# Patient Record
Sex: FEMALE | Race: BLACK OR AFRICAN AMERICAN | Employment: UNEMPLOYED | URBAN - METROPOLITAN AREA
[De-identification: names, ages, dates, MRNs, and addresses within clinical notes are randomized per-mention and may not be internally consistent; named-entity substitution may affect disease eponyms.]

---

## 2018-03-08 ENCOUNTER — OFFICE VISIT (OUTPATIENT)
Dept: OBGYN CLINIC | Age: 18
End: 2018-03-08

## 2018-03-08 VITALS
DIASTOLIC BLOOD PRESSURE: 72 MMHG | SYSTOLIC BLOOD PRESSURE: 110 MMHG | HEIGHT: 62 IN | WEIGHT: 112 LBS | BODY MASS INDEX: 20.61 KG/M2

## 2018-03-08 DIAGNOSIS — N92.0 MENORRHAGIA WITH REGULAR CYCLE: ICD-10-CM

## 2018-03-08 DIAGNOSIS — Z30.09 FAMILY PLANNING: ICD-10-CM

## 2018-03-08 DIAGNOSIS — Z11.3 SCREENING FOR VENEREAL DISEASE: ICD-10-CM

## 2018-03-08 DIAGNOSIS — Z01.411 ENCOUNTER FOR GYNECOLOGICAL EXAMINATION (GENERAL) (ROUTINE) WITH ABNORMAL FINDINGS: Primary | ICD-10-CM

## 2018-03-08 LAB
HCG URINE, QL. (POC): NEGATIVE
VALID INTERNAL CONTROL?: YES

## 2018-03-08 NOTE — PROGRESS NOTES
[address] [facility]  [email or web address]  [phone]    Cesar Lainez MD, FACOG       Annual Gynecologic Exam:  WWE <40  Chief Complaint   Patient presents with    Well Woman    Menstrual Problem         Aminah Gonsales is a 19 y.o.  [address] female who presents for an annual well woman exam.  Patient's last menstrual period was 2018 (exact date). .    With regard to the Gardisil vaccine, she received 2 of the 3 injections. She does report additional concerns today. Patient reports heavy periods with cramping and vomiting every month. Patient states that her periods have gotten heavier and more painful as she has gotten older. Patient would like to discuss hormonal options to help with her periods. +SA, + condoms. Menstrual status:  Her periods are heavy, with cramping. She does report dysmenorrhea/painful menses. She does not report irregular bleeding. Sexual history and Contraception:  History   Sexual Activity    Sexual activity: Yes    Partners: Male    Birth control/ protection: None     She sometimes use condoms with sexual activity  She does not reports new sexual partner(s) in   e last year. The patient does not request STD testing. We recommended testing per CDC guidelines and at patient request.     Preventive Medicine History:  Her most recent Pap smear result: N/A   Her most recent HR HPV screen was N/A  She does not have a history of MANJINDER 2, 3 or cervical cancer. History reviewed. No pertinent past medical history.   OB History    Para Term  AB Living   1    1    SAB TAB Ectopic Molar Multiple Live Births    1          # Outcome Date GA Lbr Rickie/2nd Weight Sex Delivery Anes PTL Lv   1 TAB 2016                Past Surgical History:   Procedure Laterality Date    HX HERNIA REPAIR      HX WISDOM TEETH EXTRACTION       Family History   Problem Relation Age of Onset    Prostate Cancer Father Social History     Social History    Marital status: SINGLE     Spouse name: N/A    Number of children: N/A    Years of education: N/A     Occupational History    Not on file. Social History Main Topics    Smoking status: Former Smoker    Smokeless tobacco: Never Used    Alcohol use Yes      Comment: a few weekends per month    Drug use: No    Sexual activity: Yes     Partners: Male     Birth control/ protection: None     Other Topics Concern    Not on file     Social History Narrative    No narrative on file       No Known Allergies    Current Outpatient Prescriptions   Medication Sig    norethindrone-e estradiol-iron (LOMEDIA 24 FE) 1 mg-20 mcg (24)/75 mg (4) tab Take 1 Tab by mouth daily. Indications: MENORRHAGIA     No current facility-administered medications for this visit. There is no problem list on file for this patient.       Review of Systems - History obtained from the patient  Constitutional: negative for weight loss, fever, night sweats  HEENT: negative for hearing loss, earache, congestion, snoring, sorethroat  CV: negative for chest pain, palpitations, edema  Resp: negative for cough, shortness of breath, wheezing  GI: negative for change in bowel habits, abdominal pain, black or bloody stools  : negative for frequency, dysuria, hematuria  GYN: see HPI  MSK: negative for back pain, joint pain, muscle pain  Breast: negative for breast lumps, nipple discharge, galactorrhea  Skin :negative for itching, rash, hives  Neuro: negative for dizziness, headache, confusion, weakness  Psych: negative for anxiety, depression, change in mood  Heme/lymph: negative for bleeding, bruising, pallor    Physical Exam  Visit Vitals    /72    Ht 5' 2\" (1.575 m)    Wt 112 lb (50.8 kg)    LMP 03/06/2018 (Exact Date)    BMI 20.49 kg/m2       Constitutional  · Appearance: well-nourished, well developed, alert, in no acute distress    HENT  · Head and Face: appears normal    Neck  · Inspection/Palpation: normal appearance, no masses or tenderness  · Lymph Nodes: no lymphadenopathy present  · Thyroid: gland size normal, nontender, no nodules or masses present on palpation    Chest  · Respiratory Effort: breathing unlabored  · Auscultation: normal breath sounds    Cardiovascular  · Heart:  · Auscultation: regular rate and rhythm without murmur    Breasts  · Inspection of Breasts: breasts symmetrical, no skin changes, no discharge present, nipple appearance normal, no skin retraction present  · Palpation of Breasts and Axillae: no masses present on palpation, no breast tenderness  · Axillary Lymph Nodes: no lymphadenopathy present    Gastrointestinal  · Abdominal Examination: abdomen non-tender to palpation, normal bowel sounds, no masses present  · Liver and spleen: no hepatomegaly present, spleen not palpable  · Hernias: no hernias identified    Genitourinary  · External Genitalia: normal appearance for age, no discharge present, no tenderness present, no inflammatory lesions present, no masses present  · Vagina: normal vaginal vault without central or paravaginal defects, blood tinged discharge present, no inflammatory lesions present, no masses present  · Bladder: non-tender to palpation  · Urethra: appears normal  · Cervix: normal   · Uterus: normal size, shape and consistency  · Adnexa: no adnexal tenderness present, no adnexal masses present  · Perineum: perineum within normal limits, no evidence of trauma, no rashes or skin lesions present  · Anus: anus within normal limits, no hemorrhoids present  · Inguinal Lymph Nodes: no lymphadenopathy present    Skin  · General Inspection: no rash, no lesions identified    Neurologic/Psychiatric  · Mental Status:  · Orientation: grossly oriented to person, place and time  · Mood and Affect: mood normal, affect appropriate    Assessment:  19 y.o.  for well woman exam  Encounter Diagnoses   Name Primary?     Encounter for gynecological examination (general) (routine) with abnormal findings Yes    Screening for venereal disease     Family planning     Menorrhagia with regular cycle        Plan:  The patient was counseled about diet, exercise, healthy lifestyle  We discussed self breast exam  We discussed safer sex practices, condom use and risk factors for sexually transmitted diseases. We discussed current pap smear and HR HPV testing guidelines. We recommend follow up one year for routine annual gynecologic exam or sooner prn  We recommend routine follow up with her primary care doctor for management of chronic medical problems and non-gynecologic concerns  Handouts were given to the patient  We discussed calcium/vitamin D/weight bearing exercise and osteoporosis prevention  Discussed risks, benefits and alternatives of OCP/nuvaring/patch: including but not limited to dvt/pe/mi/cva/ca/gi risks. She is encouraged to read package insert and to follow up with me or her pharmacist with any questions or concerns. We discussed progesterone only and non hormonal options for contraception including but not limited to condoms, IUDs, Nexplanon, and depo provera. ocp fu 3-4 mos  ocp ho  [place] to start OCP on first day of cycle and use back up for first pack.     Folllow up:  [x] return for annual well woman exam in one year or sooner if she is having problems  [] follow up and ultrasound  [] 6 months  [] 3 months  [] 6 weeks   [] 1 month    Orders Placed This Encounter    CHLAMYDIA/GC PCR    AMB POC URINE PREGNANCY TEST, VISUAL COLOR COMPARISON    norethindrone-e estradiol-iron (LOMEDIA 24 FE) 1 mg-20 mcg (24)/75 mg (4) tab       Results for orders placed or performed in visit on 03/08/18   AMB POC URINE PREGNANCY TEST, VISUAL COLOR COMPARISON   Result Value Ref Range    VALID INTERNAL CONTROL POC Yes     HCG urine, Ql. (POC) Negative Negative

## 2018-03-08 NOTE — PATIENT INSTRUCTIONS

## 2018-03-08 NOTE — MR AVS SNAPSHOT
[address] [facility] [address] [facility] 
[phone] Patient: Belinda Mccrary MRN: [id number] Visit Information Date & Time Provider Department Dept. Phone Encounter #  
 3/8/2018  9:00 AM Maggie Andrews MD Adan Gilliam [phone] [id number] Upcoming Health Maintenance Date Due  
 HPV AGE 9Y-34Y (1 of 3 - Female 3 Dose Series) 2/16/2009 Influenza Age 5 to Adult 8/1/2017 Allergies as of 3/8/2018  Review Complete On: 3/8/2018 By: Malia Moy LPN No Known Allergies Current Immunizations  Never Reviewed No immunizations on file. Not reviewed this visit Vitals BP Height(growth percentile) Weight(growth percentile) LMP BMI OB Status 110/72 5' 2\" (1.575 m) 112 lb (50.8 kg) 03/06/2018 (Exact Date) 20.49 kg/m2 Having regular periods Smoking Status Former Smoker BMI and BSA Data Body Mass Index Body Surface Area  
 20.49 kg/m 2 1.49 m 2 Your Updated Medication List  
  
Notice  As of 3/8/2018  9:16 AM  
 You have not been prescribed any medications. Patient Instructions Well Visit, Ages 25 to 48: Care Instructions Your Care Instructions Physical exams can help you stay healthy. Your doctor has checked your overall health and may have suggested ways to take good care of yourself. He or she also may have recommended tests. At home, you can help prevent illness with healthy eating, regular exercise, and other steps. Follow-up care is a key part of your treatment and safety. Be sure to make and go to all appointments, and call your doctor if you are having problems. It's also a good idea to know your test results and keep a list of the medicines you take. How can you care for yourself at home? · Reach and stay at a healthy weight.  This will lower your risk for many problems, such as obesity, diabetes, heart disease, and high blood pressure. · Get at least 30 minutes of physical activity on most days of the week. Walking is a good choice. You also may want to do other activities, such as running, swimming, cycling, or playing tennis or team sports. Discuss any changes in your exercise program with your doctor. · Do not smoke or allow others to smoke around you. If you need help quitting, talk to your doctor about stop-smoking programs and medicines. These can increase your chances of quitting for good. · Talk to your doctor about whether you have any risk factors for sexually transmitted infections (STIs). Having one sex partner (who does not have STIs and does not have sex with anyone else) is a good way to avoid these infections. · Use birth control if you do not want to have children at this time. Talk with your doctor about the choices available and what might be best for you. · Protect your skin from too much sun. When you're outdoors from 10 a.m. to 4 p.m., stay in the shade or cover up with clothing and a hat with a wide brim. Wear sunglasses that block UV rays. Even when it's cloudy, put broad-spectrum sunscreen (SPF 30 or higher) on any exposed skin. · See a dentist one or two times a year for checkups and to have your teeth cleaned. · Wear a seat belt in the car. · Drink alcohol in moderation, if at all. That means no more than 2 drinks a day for men and 1 drink a day for women. Follow your doctor's advice about when to have certain tests. These tests can spot problems early. For everyone · Cholesterol. Have the fat (cholesterol) in your blood tested after age 21. Your doctor will tell you how often to have this done based on your age, family history, or other things that can increase your risk for heart disease. · Blood pressure.  Have your blood pressure checked during a routine doctor visit. Your doctor will tell you how often to check your blood pressure based on your age, your blood pressure results, and other factors. · Vision. Talk with your doctor about how often to have a glaucoma test. 
· Diabetes. Ask your doctor whether you should have tests for diabetes. · Colon cancer. Have a test for colon cancer at age 48. You may have one of several tests. If you are younger than 48, you may need a test earlier if you have any risk factors. Risk factors include whether you already had a precancerous polyp removed from your colon or whether your parent, brother, sister, or child has had colon cancer. For women · Breast exam and mammogram. Talk to your doctor about when you should have a clinical breast exam and a mammogram. Medical experts differ on whether and how often women under 50 should have these tests. Your doctor can help you decide what is right for you. · Pap test and pelvic exam. Begin Pap tests at age 24. A Pap test is the best way to find cervical cancer. The test often is part of a pelvic exam. Ask how often to have this test. 
· Tests for sexually transmitted infections (STIs). Ask whether you should have tests for STIs. You may be at risk if you have sex with more than one person, especially if your partners do not wear condoms. For men · Tests for sexually transmitted infections (STIs). Ask whether you should have tests for STIs. You may be at risk if you have sex with more than one person, especially if you do not wear a condom. · Testicular cancer exam. Ask your doctor whether you should check your testicles regularly. · Prostate exam. Talk to your doctor about whether you should have a blood test (called a PSA test) for prostate cancer. Experts differ on whether and when men should have this test. Some experts suggest it if you are older than 39 and are -American or have a father or brother who got prostate cancer when he was younger than 72. When should you call for help? Watch closely for changes in your health, and be sure to contact your doctor if you have any problems or symptoms that concern you. Where can you learn more? Go to http://clive-bibi.info/. Enter P072 in the search box to learn more about \"Well Visit, Ages 25 to 48: Care Instructions. \" Current as of: May 12, 2017 Content Version: 11.4 © 3757-5731 [employer]. Care instructions adapted under license by [employer] (which disclaims liability or warranty for this information). If you have questions about a medical condition or this instruction, always ask your healthcare professional. [address] any warranty or liability for your use of this information. Introducing [facility]! David Davis introduces Kiveda patient portal. Now you can access parts of your medical record, email your doctor's office, and request medication refills online. 1. In your internet browser, go to [email or web address]/"[email or web address] 2. Click on the First Time User? Click Here link in the Sign In box. You will see the New Member Sign Up page. 3. Enter your Kiveda Access Code exactly as it appears below. You will not need to use this code after youve completed the sign-up process. If you do not sign up before the expiration date, you must request a new code. · Kiveda Access Code: [email or web address] Expires: 6/6/2018  9:16 AM 
 
4. Enter the last four digits of your Social Security Number (xxxx) and Date of Birth (mm/dd/yyyy) as indicated and click Submit. You will be taken to the next sign-up page. 5. Create a Re-Composet ID. This will be your Kiveda login ID and cannot be changed, so think of one that is secure and easy to remember. 6. Create a Kiveda password. You can change your password at any time. 7. Enter your Password Reset Question and Answer.  This can be used at a later time if you forget your password. 8. Enter your e-mail address. You will receive e-mail notification when new information is available in [address] 9. Click Sign Up. You can now view and download portions of your medical record. 10. Click the Download Summary menu link to download a portable copy of your medical information. If you have questions, please visit the Frequently Asked Questions section of the [facility] website. Remember, [facility] is NOT to be used for urgent needs. For medical emergencies, dial 911. Now available from your iPhone and Android! Please provide this summary of care documentation to your next provider. If you have any questions after today's visit, please call [phone].

## 2018-03-10 LAB
C TRACH RRNA SPEC QL NAA+PROBE: NEGATIVE
N GONORRHOEA RRNA SPEC QL NAA+PROBE: NEGATIVE

## 2018-05-29 ENCOUNTER — OFFICE VISIT (OUTPATIENT)
Dept: OBGYN CLINIC | Age: 18
End: 2018-05-29

## 2018-05-29 VITALS
HEIGHT: 62 IN | BODY MASS INDEX: 22.45 KG/M2 | WEIGHT: 122 LBS | SYSTOLIC BLOOD PRESSURE: 98 MMHG | DIASTOLIC BLOOD PRESSURE: 62 MMHG

## 2018-05-29 DIAGNOSIS — N94.6 DYSMENORRHEA: ICD-10-CM

## 2018-05-29 DIAGNOSIS — N92.4 EXCESSIVE BLEEDING IN PREMENOPAUSAL PERIOD: Primary | ICD-10-CM

## 2018-05-29 NOTE — PROGRESS NOTES
[address] [facility]  [email or web address]    Indira Sauer MD, [address] [facility]       OB/GYN Follow-up visit    Chief Complaint: Follow up visit  Chief Complaint   Patient presents with    Heavy Period     improved         History of Present Illness: This is a follow up visit from 03/08/2018. She is having a follow up for heavy menstrual cycles. She reports the symptoms are has improved. Aggravating factors include none. Alleviating factors include none. She does not have other concerns. She reports no new sexual partners. occ cramping, but better. LMP: Patient's last menstrual period was 04/24/2018. PFSH:  No past medical history on file. Past Surgical History:   Procedure Laterality Date    HX HERNIA REPAIR  2005    HX WISDOM TEETH EXTRACTION  2014     Family History   Problem Relation Age of Onset    Prostate Cancer Father      Social History   Substance Use Topics    Smoking status: Former Smoker    Smokeless tobacco: Never Used    Alcohol use Yes      Comment: a few weekends per month     No Known Allergies  Current Outpatient Prescriptions   Medication Sig    norethindrone-e estradiol-iron (LOMEDIA 24 FE) 1 mg-20 mcg (24)/75 mg (4) tab Take 1 Tab by mouth daily. Indications: MENORRHAGIA    HYDROcodone-acetaminophen (LORTAB ELIXIR) 7.5-500 mg/15 mL oral solution Take 10 mL by mouth four (4) times daily as needed for Pain. No current facility-administered medications for this visit.         Review of Systems:  History obtained from the patient  Constitutional: negative for fevers, chills and weight loss  ENT ROS: negative for - hearing change, oral lesions or visual changes  Respiratory: negative for cough, wheezing or dyspnea on exertion  Cardiovascular: negative for chest pain, irregular heart beats, exertional chest pressure/discomfort  Gastrointestinal: negative for dysphagia, nausea and vomiting  Genito-Urinary ROS: see HPI  Inteument/breast: negative for rash, breast lump and nipple discharge  Musculoskeletal:negative for stiff joints, neck pain and muscle weakness  Endocrine ROS: negative for - breast changes, galactorrhea or temperature intolerance  Hematological and Lymphatic ROS: negative for - blood clots, bruising or swollen lymph nodes      Physical Exam:  Visit Vitals    BP 98/62    Ht 5' 2\" (1.575 m)    Wt 122 lb (55.3 kg)    BMI 22.31 kg/m2       GENERAL: alert, well appearing, and in no distress  PULM: clear to auscultation, no wheezes, rales or rhonchi, symmetric air entry   COR: normal rate and regular rhythm, S1 and S2 normal   ABDOMEN: soft, nontender, nondistended, no masses or organomegaly   EXT no c/t/e  NEURO: alert, oriented, normal speech    Assessment:  Encounter Diagnoses   Name Primary?  Excessive bleeding in premenopausal period Yes    Dysmenorrhea        Plan:  The patient is advised that she should contact the office with any questions or concerns. She should make her routine annual gynecologic appointment if needed. Discussed risks, benefits and alternatives of OCP/nuvaring/patch: including but not limited to dvt/pe/mi/cva/ca/gi risks. Orders Placed This Encounter    norethindrone-e estradiol-iron (LOMEDIA 24 FE) 1 mg-20 mcg (24)/75 mg (4) tab       No results found for this visit on 05/29/18.     Sandrita Mistry MD

## 2018-05-29 NOTE — PATIENT INSTRUCTIONS
Learning About Birth Control: Combination Pills  What are combination pills? Combination pills are used to prevent pregnancy. Most people call them \"the pill. \"  Combination pills release a regular dose of two hormones, estrogen and progestin. They prevent pregnancy in a few ways. They thicken the mucus in the cervix. This makes it hard for sperm to travel into the uterus. And they thin the lining of the uterus. This makes it harder for a fertilized egg to attach to the uterus. The hormones also can stop the ovaries from releasing an egg each month (ovulation). You have to take a pill every day to prevent pregnancy. The packages for these pills are different. The most common one has 3 weeks of hormone pills and 1 week of sugar pills. The sugar pills don't contain any hormones. You have your period on that week. But other packs have no sugar pills. If you take hormone pills for the whole month, you will not get your period as often. Or you may not get it at all. How well do they work? In the first year of use:  · When combination pills are taken exactly as directed, fewer than 1 woman out of 100 has an unplanned pregnancy. · When pills are not taken exactly as directed, such as forgetting to take them sometimes, 9 women out of 100 have an unplanned pregnancy. Be sure to tell your doctor about any health problems you have or medicines you take. He or she can help you choose the birth control method that is right for you. What are the advantages of combination pills? · These pills work better than barrier methods. Barrier methods include condoms and diaphragms. · They may reduce acne and heavy bleeding. They may also reduce cramping and other symptoms of PMS (premenstrual syndrome). · The pills let you control your periods. You can have periods every month or every few months. Or you can choose not to have them at all. · You don't have to interrupt sex to use the pills.   What are the disadvantages of combination pills? · You have to take a pill at the same time every day to prevent pregnancy. · Combination pills don't protect against sexually transmitted infections (STIs), such as herpes or HIV/AIDS. If you aren't sure if your sex partner might have an STI, use a condom to protect against disease. · They may cause changes in your period. You may have little bleeding, skipped periods, or spotting. · They may cause mood changes, less interest in sex, or weight gain. · Combination pills contain estrogen. They may not be right for you if you have certain health problems. Where can you learn more? Go to http://clive-bibi.info/. Enter J911 in the search box to learn more about \"Learning About Birth Control: Combination Pills. \"  Current as of: March 16, 2017  Content Version: 11.4  © 6105-4707 [employer]wise[employer]. Care instructions adapted under license by [employer] (which disclaims liability or warranty for this information). If you have questions about a medical condition or this instruction, always ask your healthcare professional. [address] any warranty or liability for your use of this information.

## 2018-05-29 NOTE — MR AVS SNAPSHOT
[address] [facility] [address] 1007 [facility] 
[phone] Patient: Perry Bishop MRN: [id number] EYM:9/95/1242 Visit Information Date & Time Provider Department Dept. Phone Encounter #  
 5/29/2018  8:20 AM MD Adan Reilly [phone] Your Appointments 3/15/2019  9:00 AM  
ESTABLISHED PATIENT with MD Adan Reilly ([address]) Appt Note: AE  TP  
 [address] [facility] [address] [place] [address]  
[address] [facility] [address] 100[address] [facility] Upcoming Health Maintenance Date Due  
 HPV Age 9Y-34Y (1 of 3 - Female 3 Dose Series) 2/16/2009 Influenza Age 5 to Adult 8/1/2018 Allergies as of 5/29/2018  Review Complete On: 5/29/2018 By: Mignon Valladares No Known Allergies Current Immunizations  Never Reviewed No immunizations on file. Not reviewed this visit Vitals BP Height(growth percentile) Weight(growth percentile) LMP BMI OB Status 98/62 5' 2\" (1.575 m) 122 lb (55.3 kg) 04/24/2018 22.31 kg/m2 Having regular periods Smoking Status Former Smoker Vitals History BMI and BSA Data Body Mass Index Body Surface Area  
 22.31 kg/m 2 1.56 m 2 Preferred Pharmacy Pharmacy Name Phone [facility] [place], [address] [place] AT [facility] [phone] Your Updated Medication List  
  
   
This list is accurate as of 5/29/18  8:55 AM.  Always use your most recent med list.  
  
  
  
  
 HYDROcodone-acetaminophen 7.5-500 mg/15 mL oral solution Commonly known as:  Jerson Hopper Take 10 mL by mouth four (4) times daily as needed for Pain. norethindrone-e estradiol-iron 1 mg-20 mcg (24)/75 mg (4) Tab Commonly known as:  LOMEDIA 24 FE  
 Take 1 Tab by mouth daily. Indications: MENORRHAGIA Patient Instructions Learning About Birth Control: Combination Pills What are combination pills? Combination pills are used to prevent pregnancy. Most people call them \"the pill. \" Combination pills release a regular dose of two hormones, estrogen and progestin. They prevent pregnancy in a few ways. They thicken the mucus in the cervix. This makes it hard for sperm to travel into the uterus. And they thin the lining of the uterus. This makes it harder for a fertilized egg to attach to the uterus. The hormones also can stop the ovaries from releasing an egg each month (ovulation). You have to take a pill every day to prevent pregnancy. The packages for these pills are different. The most common one has 3 weeks of hormone pills and 1 week of sugar pills. The sugar pills don't contain any hormones. You have your period on that week. But other packs have no sugar pills. If you take hormone pills for the whole month, you will not get your period as often. Or you may not get it at all. How well do they work? In the first year of use: · When combination pills are taken exactly as directed, fewer than 1 woman out of 100 has an unplanned pregnancy. · When pills are not taken exactly as directed, such as forgetting to take them sometimes, 9 women out of 100 have an unplanned pregnancy. Be sure to tell your doctor about any health problems you have or medicines you take. He or she can help you choose the birth control method that is right for you. What are the advantages of combination pills? · These pills work better than barrier methods. Barrier methods include condoms and diaphragms. · They may reduce acne and heavy bleeding. They may also reduce cramping and other symptoms of PMS (premenstrual syndrome). · The pills let you control your periods.  You can have periods every month or every few months. Or you can choose not to have them at all. · You don't have to interrupt sex to use the pills. What are the disadvantages of combination pills? · You have to take a pill at the same time every day to prevent pregnancy. · Combination pills don't protect against sexually transmitted infections (STIs), such as herpes or HIV/AIDS. If you aren't sure if your sex partner might have an STI, use a condom to protect against disease. · They may cause changes in your period. You may have little bleeding, skipped periods, or spotting. · They may cause mood changes, less interest in sex, or weight gain. · Combination pills contain estrogen. They may not be right for you if you have certain health problems. Where can you learn more? Go to http://clive-bibi.info/. Enter Q289 in the search box to learn more about \"Learning About Birth Control: Combination Pills. \" Current as of: March 16, 2017 Content Version: 11.4 © 1811-3043 Spiced Bits. Care instructions adapted under license by [employer]raiser[employer] (which disclaims liability or warranty for this information). If you have questions about a medical condition or this instruction, always ask your healthcare professional. [place] [address] any warranty or liability for your use of this information. Introducing [facility]! [facility] introduces Retention Education patient portal. Now you can access parts of your medical record, email your doctor's office, and request medication refills online. 1. In your internet browser, go to [email or web address] 2. Click on the First Time User? Click Here link in the Sign In box. You will see the New Member Sign Up page. 3. Enter your Retention Education Access Code exactly as it appears below. You will not need to use this code after youve completed the sign-up process.  If you do not sign up before the expiration date, you must request a new code. 
 
· Jogli Access Code: [email or web address] Expires: 6/6/2018 10:16 AM 
 
4. Enter the last four digits of your Social Security Number (xxxx) and Date of Birth (mm/dd/yyyy) as indicated and click Submit. You will be taken to the next sign-up page. 5. Create a Jogli ID. This will be your Jogli login ID and cannot be changed, so think of one that is secure and easy to remember. 6. Create a Jogli password. You can change your password at any time. 7. Enter your Password Reset Question and Answer. This can be used at a later time if you forget your password. 8. Enter your e-mail address. You will receive e-mail notification when new information is available in [address] 9. Click Sign Up. You can now view and download portions of your medical record. 10. Click the Download Summary menu link to download a portable copy of your medical information. If you have questions, please visit the Frequently Asked Questions section of the Jogli website. Remember, Jogli is NOT to be used for urgent needs. For medical emergencies, dial 911. Now available from your iPhone and Android! Please provide this summary of care documentation to your next provider. Your primary care clinician is listed as Zach Clark. If you have any questions after today's visit, please call [phone].

## 2018-08-21 ENCOUNTER — TELEPHONE (OUTPATIENT)
Dept: OBGYN CLINIC | Age: 18
End: 2018-08-21

## 2018-08-21 NOTE — TELEPHONE ENCOUNTER
This nurse left a detailed message regarding prescription sent in May and that it was sent with refills. Patient advised to call her pharmacy.

## 2018-08-21 NOTE — TELEPHONE ENCOUNTER
Message left at 9:11am on 8/20/18        19year old patient last seen in the office on 3/8/18 for AE. Patient left a message requesting a refill. This nurse left a message for the patient to call the office back. It appears a prescription has been sent on 5/29/18 and has refills.

## 2018-09-12 ENCOUNTER — OFFICE VISIT (OUTPATIENT)
Dept: OBGYN CLINIC | Age: 18
End: 2018-09-12

## 2018-09-12 VITALS
SYSTOLIC BLOOD PRESSURE: 104 MMHG | DIASTOLIC BLOOD PRESSURE: 60 MMHG | HEIGHT: 62 IN | WEIGHT: 121 LBS | BODY MASS INDEX: 22.26 KG/M2

## 2018-09-12 DIAGNOSIS — N92.6 IRREGULAR MENSES: ICD-10-CM

## 2018-09-12 DIAGNOSIS — N76.0 ACUTE VAGINITIS: Primary | ICD-10-CM

## 2018-09-12 DIAGNOSIS — R30.0 DYSURIA: ICD-10-CM

## 2018-09-12 DIAGNOSIS — R35.0 URINARY FREQUENCY: ICD-10-CM

## 2018-09-12 LAB
BILIRUB UR QL STRIP: NEGATIVE
GLUCOSE UR-MCNC: NEGATIVE MG/DL
HCG URINE, QL. (POC): NEGATIVE
KETONES P FAST UR STRIP-MCNC: NEGATIVE MG/DL
PH UR STRIP: 6 [PH] (ref 4.6–8)
PROT UR QL STRIP: NEGATIVE
SP GR UR STRIP: 1.02 (ref 1–1.03)
UA UROBILINOGEN AMB POC: NORMAL (ref 0.2–1)
URINALYSIS CLARITY POC: CLEAR
URINALYSIS COLOR POC: YELLOW
URINE BLOOD POC: NORMAL
URINE LEUKOCYTES POC: NORMAL
URINE NITRITES POC: NEGATIVE
VALID INTERNAL CONTROL?: YES
WET MOUNT POCT, WMPOCT: NORMAL

## 2018-09-12 RX ORDER — SULFAMETHOXAZOLE AND TRIMETHOPRIM 800; 160 MG/1; MG/1
1 TABLET ORAL 2 TIMES DAILY
Qty: 10 TAB | Refills: 0 | Status: SHIPPED | OUTPATIENT
Start: 2018-09-12 | End: 2018-09-17

## 2018-09-12 NOTE — MR AVS SNAPSHOT
[address] [facility] [address] [place] [address] 
[phone] Patient: Debbie Giles MRN: [id number] ZXL:[birth year] Visit Information Date & Time Provider Department Dept. Phone Encounter #  
 9/12/2018  1:00 PM MD Adan Hernández [phone] [id number] Your Appointments 3/15/2019  9:00 AM  
ESTABLISHED PATIENT with MD Adan Hernández ([facility]) Appt Note: AE  TP  
 [address] [facility] [address] [phone]  
[address] Upcoming Health Maintenance Date Due  
 HPV Age 9Y-34Y (1 of 3 - Female 3 Dose Series) 2/16/2009 Influenza Age 5 to Adult 8/1/2018 Allergies as of 9/12/2018  Review Complete On: 9/12/2018 By: Bryan Loco MD  
 No Known Allergies Current Immunizations  Never Reviewed No immunizations on file. Not reviewed this visit You Were Diagnosed With   
  
 Codes Comments Acute vaginitis    -  Primary ICD-10-CM: N76.0 ICD-9-CM: 616.10 Dysuria     ICD-10-CM: R30.0 ICD-9-CM: 788.1 Urinary frequency     ICD-10-CM: R35.0 ICD-9-CM: 788.41 Irregular menses     ICD-10-CM: N92.6 ICD-9-CM: 626.4 Vitals BP Height(growth percentile) Weight(growth percentile) LMP BMI OB Status 104/60 5' 2\" (1.575 m) 121 lb (54.9 kg) 08/07/2018 (Exact Date) 22.13 kg/m2 Unknown Smoking Status Former Smoker BMI and BSA Data Body Mass Index Body Surface Area  
 22.13 kg/m 2 1.55 m 2 Preferred Pharmacy Pharmacy Name Phone [facility] [address] [place] [facility] [phone] Your Updated Medication List  
  
   
This list is accurate as of 9/12/18  1:36 PM.  Always use your most recent med list.  
  
  
  
  
 HYDROcodone-acetaminophen 7.5-500 mg/15 mL oral solution Commonly known as:  Kong Mustache Take 10 mL by mouth four (4) times daily as needed for Pain. norethindrone-e estradiol-iron 1 mg-20 mcg (24)/75 mg (4) Tab Commonly known as:  LOMEDIA 24 FE Take 1 Tab by mouth daily. Indications: MENORRHAGIA  
  
 trimethoprim-sulfamethoxazole 160-800 mg per tablet Commonly known as:  BACTRIM DS Take 1 Tab by mouth two (2) times a day for 5 days. Prescriptions Sent to Pharmacy Refills  
 trimethoprim-sulfamethoxazole (BACTRIM DS) 160-800 mg per tablet 0 Sig: Take 1 Tab by mouth two (2) times a day for 5 days. Class: Normal  
 Pharmacy: [facility] [address] #: [phone] Route: Oral  
  
We Performed the Following AMB POC SMEAR, STAIN & Rasta Aurelio MOUNT [id number] CPT(R)] AMB POC URINALYSIS DIP STICK MANUAL W/O MICRO [76354 CPT(R)] AMB POC URINE PREGNANCY TEST, VISUAL COLOR COMPARISON [47664 CPT(R)] CULTURE, URINE [id number] CPT(R)] [address] [id number] Custom] Patient Instructions Urinary Tract Infection in Women: Care Instructions Your Care Instructions A urinary tract infection, or UTI, is a general term for an infection anywhere between the kidneys and the urethra (where urine comes out). Most UTIs are bladder infections. They often cause pain or burning when you urinate. UTIs are caused by bacteria and can be cured with antibiotics. Be sure to complete your treatment so that the infection goes away. Follow-up care is a key part of your treatment and safety. Be sure to make and go to all appointments, and call your doctor if you are having problems. It's also a good idea to know your test results and keep a list of the medicines you take. How can you care for yourself at home? · Take your antibiotics as directed. Do not stop taking them just because you feel better. You need to take the full course of antibiotics. · Drink extra water and other fluids for the next day or two. This may help wash out the bacteria that are causing the infection. (If you have kidney, heart, or liver disease and have to limit fluids, talk with your doctor before you increase your fluid intake.) · Avoid drinks that are carbonated or have caffeine. They can irritate the bladder. · Urinate often. Try to empty your bladder each time. · To relieve pain, take a hot bath or lay a heating pad set on low over your lower belly or genital area. Never go to sleep with a heating pad in place. To prevent UTIs · Drink plenty of water each day. This helps you urinate often, which clears bacteria from your system. (If you have kidney, heart, or liver disease and have to limit fluids, talk with your doctor before you increase your fluid intake.) · Urinate when you need to. · Urinate right after you have sex. · Change sanitary pads often. · Avoid douches, bubble baths, feminine hygiene sprays, and other feminine hygiene products that have deodorants. · After going to the bathroom, wipe from front to back. When should you call for help? Call your doctor now or seek immediate medical care if: 
  · Symptoms such as fever, chills, nausea, or vomiting get worse or appear for the first time.  
  · You have new pain in your back just below your rib cage. This is called flank pain.  
  · There is new blood or pus in your urine.  
  · You have any problems with your antibiotic medicine.  
 Watch closely for changes in your health, and be sure to contact your doctor if: 
  · You are not getting better after taking an antibiotic for 2 days.  
  · Your symptoms go away but then come back. Where can you learn more? Go to http://clive-bibi.info/. Enter H387 in the search box to learn more about \"Urinary Tract Infection in Women: Care Instructions. \" Current as of: May 12, 2017 Content Version: 11.7 © 4551-6275 [employer]ient [employer]. Care instructions adapted under license by [employer] (which disclaims liability or warranty for this information). If you have questions about a medical condition or this instruction, always ask your healthcare professional. [address] any warranty or liability for your use of this information. Introducing [facility]! Alfonso Javadramos introduces [employer] patient portal. Now you can access parts of your medical record, email your doctor's office, and request medication refills online. 1. In your internet browser, go to [email or web address] 2. Click on the First Time User? Click Here link in the Sign In box. You will see the New Member Sign Up page. 3. Enter your [employer] Access Code exactly as it appears below. You will not need to use this code after youve completed the sign-up process. If you do not sign up before the expiration date, you must request a new code. · [employer]. Access Code: [email or web address] Expires: 12/11/2018  1:36 PM 
 
4. Enter the last four digits of your Social Security Number (xxxx) and Date of Birth (mm/dd/yyyy) as indicated and click Submit. You will be taken to the next sign-up page. 5. Create a [employer]viously Inc. ID. This will be your [employer] login ID and cannot be changed, so think of one that is secure and easy to remember. 6. Create a [employer] password. You can change your password at any time. 7. Enter your Password Reset Question and Answer. This can be used at a later time if you forget your password. 8. Enter your e-mail address. You will receive e-mail notification when new information is available in [address] 9. Click Sign Up. You can now view and download portions of your medical record. 10. Click the Download Summary menu link to download a portable copy of your medical information. If you have questions, please visit the Frequently Asked Questions section of the [employer] website. Remember, [employer] is NOT to be used for urgent needs. For medical emergencies, dial 911. Now available from your iPhone and Android! Please provide this summary of care documentation to your next provider. Your primary care clinician is listed as Ab Fortune. If you have any questions after today's visit, please call [phone].

## 2018-09-12 NOTE — PROGRESS NOTES
[address] [facility]  [email or web address]/  [phone]    Jacob Puente MD, FACOG       OB/GYN Problem visit    Chief Complaint:   Chief Complaint   Patient presents with    UTI    Urinary Pain    Other     std testing       History of Present Illness: This is a new problem being evaluated by this provider. The patient is a 19 y.o.  female who reports having dysuria and frequency for 4 days. She reports the symptoms are has worsened slightly. Aggravating factors include none. Alleviating factors include cranberry juice. + new partner  +vag odor    She does not have other concerns. She reports new sexual partners, wants std testing      LMP: Patient's last menstrual period was 2018 (exact date). PFSH:  History reviewed. No pertinent past medical history. Past Surgical History:   Procedure Laterality Date    HX HERNIA REPAIR      HX WISDOM TEETH EXTRACTION       Family History   Problem Relation Age of Onset    Prostate Cancer Father      Social History   Substance Use Topics    Smoking status: Former Smoker    Smokeless tobacco: Never Used    Alcohol use Yes      Comment: a few weekends per month     No Known Allergies  Current Outpatient Prescriptions   Medication Sig    trimethoprim-sulfamethoxazole (BACTRIM DS) 160-800 mg per tablet Take 1 Tab by mouth two (2) times a day for 5 days.  norethindrone-e estradiol-iron (LOMEDIA 24 FE) 1 mg-20 mcg (24)/75 mg (4) tab Take 1 Tab by mouth daily. Indications: MENORRHAGIA    HYDROcodone-acetaminophen (LORTAB ELIXIR) 7.5-500 mg/15 mL oral solution Take 10 mL by mouth four (4) times daily as needed for Pain. No current facility-administered medications for this visit.         Review of Systems:  History obtained from the patient  Constitutional: negative for fevers, chills and weight loss  ENT ROS: negative for - hearing change, oral lesions or visual changes  Respiratory: negative for cough, wheezing or dyspnea on exertion  Cardiovascular: negative for chest pain, irregular heart beats, exertional chest pressure/discomfort  Gastrointestinal: negative for dysphagia, nausea and vomiting  Genito-Urinary ROS:  see HPI  Inteument/breast: negative for rash, breast lump and nipple discharge  Musculoskeletal:negative for stiff joints, neck pain and muscle weakness  Endocrine ROS: negative for - breast changes, galactorrhea or temperature intolerance  Hematological and Lymphatic ROS: negative for - blood clots, bruising or swollen lymph nodes    Physical Exam:  Visit Vitals    /60    Ht 5' 2\" (1.575 m)    Wt 121 lb (54.9 kg)    BMI 22.13 kg/m2       GENERAL: alert, well appearing, and in no distress  HEAD: normocephalic, atraumatic. ABDOMEN: soft, nontender, nondistended, no masses or organomegaly   EGBUS: no lesions, no inflammation, no masses, tender at introitus  VULVA: normal appearing vulva with no masses, tenderness or lesions  VAGINA: normal appearing vagina with normal color, no lesions, white and thin' discharge  CERVIX: normal appearing cervix without discharge or lesions, non tender  UTERUS: uterus is normal size, shape, consistency and nontender   ADNEXA: normal adnexa in size, nontender and no masses  NEURO: alert, oriented, normal speech  BACK no cvat    Assessment:  Encounter Diagnoses   Name Primary?  Acute vaginitis Yes    Dysuria     Urinary frequency     Irregular menses        Plan:  The patient is advised that she should contact the office if she does not note improvement or if symptoms recur  Recommend follow up with PCP for non-gynecologic complaints and chronic medical problems. She should contact our office with any questions or concerns  She could keep her routine annual exam appointment.    UTI precautions, pyelo precautions given  Disc potential causes and etiology of UTI  Notify MD if NI in symptoms or worsening symptoms: pain/fever  Recommend increased water intake, regular voiding, voiding after coitus, avoid bladder irritants  Pt notified we will contact them with culture results and alter treatment if needed  If symptoms persist after treatment or recur: rec follow up evaulation  We discussed potential causes of vaginal discharge/irritation. We discussed good vulvar hygiene. Recommended avoid vaginal irritants. Discussed use of mild soaps/detergents. Follow up if NI. We reviewed wet prep findings with the patient at her visit. Patient will be notified about swab results and prescription sent, if indicated.        Orders Placed This Encounter    CULTURE, URINE    NUSWAB VAGINITIS PLUS    AMB POC URINALYSIS DIP STICK MANUAL W/O MICRO    AMB POC URINE PREGNANCY TEST, VISUAL COLOR COMPARISON    AMB POC WET PREP (AKA STAIN, INTERPRET, WET MOUNT)    trimethoprim-sulfamethoxazole (BACTRIM DS) 160-800 mg per tablet       Results for orders placed or performed in visit on 09/12/18   AMB POC URINALYSIS DIP STICK MANUAL W/O MICRO   Result Value Ref Range    Color (UA POC) Yellow     Clarity (UA POC) Clear     Glucose (UA POC) Negative Negative    Bilirubin (UA POC) Negative Negative    Ketones (UA POC) Negative Negative    Specific gravity (UA POC) 1.025 1.001 - 1.035    Blood (UA POC) Trace Negative    pH (UA POC) 6.0 4.6 - 8.0    Protein (UA POC) Negative Negative    Urobilinogen (UA POC) normal 0.2 - 1    Nitrites (UA POC) Negative Negative    Leukocyte esterase (UA POC) 1+ Negative   AMB POC URINE PREGNANCY TEST, VISUAL COLOR COMPARISON   Result Value Ref Range    VALID INTERNAL CONTROL POC Yes     HCG urine, Ql. (POC) Negative Negative   AMB POC SMEAR, STAIN & INTERPRET, WET MOUNT   Result Value Ref Range    Wet mount (POC)       Wet prep  Results for orders placed or performed in visit on 09/12/18   AMB POC URINALYSIS DIP STICK MANUAL W/O MICRO   Result Value Ref Range    Color (UA POC) Yellow     Clarity (UA POC) Clear     Glucose (UA POC) Negative Negative Bilirubin (UA POC) Negative Negative    Ketones (UA POC) Negative Negative    Specific gravity (UA POC) 1.025 1.001 - 1.035    Blood (UA POC) Trace Negative    pH (UA POC) 6.0 4.6 - 8.0    Protein (UA POC) Negative Negative    Urobilinogen (UA POC) normal 0.2 - 1    Nitrites (UA POC) Negative Negative    Leukocyte esterase (UA POC) 1+ Negative   AMB POC URINE PREGNANCY TEST, VISUAL COLOR COMPARISON   Result Value Ref Range    VALID INTERNAL CONTROL POC Yes     HCG urine, Ql. (POC) Negative Negative   AMB POC SMEAR, STAIN & INTERPRET, WET MOUNT   Result Value Ref Range    Wet mount (POC)       SAM    Hypae: negative  Buds: negative    Wet Prep:  Trich: negative  Clue cells: negative  Hyphae: negative  Buds: negative  WBC's: normal

## 2018-09-12 NOTE — PATIENT INSTRUCTIONS
Urinary Tract Infection in Women: Care Instructions  Your Care Instructions    A urinary tract infection, or UTI, is a general term for an infection anywhere between the kidneys and the urethra (where urine comes out). Most UTIs are bladder infections. They often cause pain or burning when you urinate. UTIs are caused by bacteria and can be cured with antibiotics. Be sure to complete your treatment so that the infection goes away. Follow-up care is a key part of your treatment and safety. Be sure to make and go to all appointments, and call your doctor if you are having problems. It's also a good idea to know your test results and keep a list of the medicines you take. How can you care for yourself at home? · Take your antibiotics as directed. Do not stop taking them just because you feel better. You need to take the full course of antibiotics. · Drink extra water and other fluids for the next day or two. This may help wash out the bacteria that are causing the infection. (If you have kidney, heart, or liver disease and have to limit fluids, talk with your doctor before you increase your fluid intake.)  · Avoid drinks that are carbonated or have caffeine. They can irritate the bladder. · Urinate often. Try to empty your bladder each time. · To relieve pain, take a hot bath or lay a heating pad set on low over your lower belly or genital area. Never go to sleep with a heating pad in place. To prevent UTIs  · Drink plenty of water each day. This helps you urinate often, which clears bacteria from your system. (If you have kidney, heart, or liver disease and have to limit fluids, talk with your doctor before you increase your fluid intake.)  · Urinate when you need to. · Urinate right after you have sex. · Change sanitary pads often. · Avoid douches, bubble baths, feminine hygiene sprays, and other feminine hygiene products that have deodorants.   · After going to the bathroom, wipe from front to back.  When should you call for help? Call your doctor now or seek immediate medical care if:    · Symptoms such as fever, chills, nausea, or vomiting get worse or appear for the first time.     · You have new pain in your back just below your rib cage. This is called flank pain.     · There is new blood or pus in your urine.     · You have any problems with your antibiotic medicine.    Watch closely for changes in your health, and be sure to contact your doctor if:    · You are not getting better after taking an antibiotic for 2 days.     · Your symptoms go away but then come back. Where can you learn more? Go to http://clive-bibi.info/. Enter R034 in the search box to learn more about \"Urinary Tract Infection in Women: Care Instructions. \"  Current as of: May 12, 2017  Content Version: 11.7  © 6701-7067 [employer]. Care instructions adapted under license by [employer] (which disclaims liability or warranty for this information). If you have questions about a medical condition or this instruction, always ask your healthcare professional. [address] any warranty or liability for your use of this information.

## 2018-09-13 LAB — BACTERIA UR CULT: NORMAL

## 2018-09-14 ENCOUNTER — TELEPHONE (OUTPATIENT)
Dept: OBGYN CLINIC | Age: 18
End: 2018-09-14

## 2018-09-14 LAB
A VAGINAE DNA VAG QL NAA+PROBE: ABNORMAL SCORE
BVAB2 DNA VAG QL NAA+PROBE: ABNORMAL SCORE
C ALBICANS DNA VAG QL NAA+PROBE: NEGATIVE
C GLABRATA DNA VAG QL NAA+PROBE: NEGATIVE
C TRACH RRNA SPEC QL NAA+PROBE: NEGATIVE
MEGA1 DNA VAG QL NAA+PROBE: ABNORMAL SCORE
N GONORRHOEA RRNA SPEC QL NAA+PROBE: NEGATIVE
T VAGINALIS RRNA SPEC QL NAA+PROBE: NEGATIVE

## 2018-09-14 RX ORDER — METRONIDAZOLE 500 MG/1
500 TABLET ORAL 2 TIMES DAILY
Qty: 14 TAB | Refills: 0 | Status: SHIPPED | OUTPATIENT
Start: 2018-09-14 | End: 2018-09-21

## 2018-09-14 NOTE — PROGRESS NOTES
The results are normal. NO UTI, fu if NI  Please notify patient. Recommend f/u if still having symptoms/problems or has additional concerns.

## 2018-09-14 NOTE — TELEPHONE ENCOUNTER
Patient is calling for lab results form /12/18 with TP:    Notes Recorded by Arthur Philippe MD on 9/14/2018 at 10:46 AM  Abnormal, notify patient. Consistent with BV   Rx:   flagyl 500mg bid   x 7 days    May cause nausea, take with food  Avoid etoh during treatment and 1 day following  Notify MD if NI after 1 week    (If patient prefers vaginal tx't  0.75 %t metronidazole vaginal gel   5 grams qhs per vagina  x5 days)      Patient prefers to take oral form of Flagyl. She is about to finish up with Sulfa medication for a uti and will start after that therapy. She has been advised to avoid etoh.     wesley Campo and [employer]

## 2018-09-14 NOTE — PROGRESS NOTES
Abnormal, notify patient.   Consistent with BV   Rx:   flagyl 500mg bid   x 7 days    May cause nausea, take with food  Avoid etoh during treatment and 1 day following  Notify MD if NI after 1 week    (If patient prefers vaginal tx't  0.75 %t metronidazole vaginal gel   5 grams qhs per vagina  x5 days)

## 2019-01-03 ENCOUNTER — OFFICE VISIT (OUTPATIENT)
Dept: OBGYN CLINIC | Age: 19
End: 2019-01-03

## 2019-01-03 VITALS
BODY MASS INDEX: 22.26 KG/M2 | WEIGHT: 121 LBS | HEIGHT: 62 IN | SYSTOLIC BLOOD PRESSURE: 104 MMHG | DIASTOLIC BLOOD PRESSURE: 60 MMHG

## 2019-01-03 DIAGNOSIS — R35.0 URINARY FREQUENCY: ICD-10-CM

## 2019-01-03 DIAGNOSIS — R30.0 BURNING WITH URINATION: Primary | ICD-10-CM

## 2019-01-03 LAB
BILIRUB UR QL STRIP: NEGATIVE
GLUCOSE UR-MCNC: NEGATIVE MG/DL
KETONES P FAST UR STRIP-MCNC: NEGATIVE MG/DL
PH UR STRIP: 7 [PH] (ref 4.6–8)
PROT UR QL STRIP: NORMAL
SP GR UR STRIP: 1 (ref 1–1.03)
UA UROBILINOGEN AMB POC: NORMAL (ref 0.2–1)
URINALYSIS CLARITY POC: CLEAR
URINALYSIS COLOR POC: YELLOW
URINE BLOOD POC: NEGATIVE
URINE LEUKOCYTES POC: NEGATIVE
URINE NITRITES POC: NEGATIVE

## 2019-01-03 RX ORDER — NITROFURANTOIN 25; 75 MG/1; MG/1
100 CAPSULE ORAL 2 TIMES DAILY
Qty: 14 CAP | Refills: 0 | Status: SHIPPED | OUTPATIENT
Start: 2019-01-03 | End: 2020-03-16

## 2019-01-03 NOTE — PROGRESS NOTES
UTI note    Aminah Wallace is a ,  19 y.o. female [address] who presents today with had concerns including UTI. Radha Katharina Her symptoms started 1 week ago, unchanged since that time. Discomfort is in the suprapubic area and does not radiate. Symptoms are not alleviated by hydration. Symptoms are exacerbated with activity. Patient's other complaints: none. Her symptoms are moderate. Patient denies vaginal discharge. There is not any concern of sexual abuse. There is not a history of trauma to the genital area. Patient does not have a history of recurrent UTI. She does not have a history of pyelonephritis. She has a history of  has no past medical history on file. with the following surgical history  has a past surgical history that includes hx hernia repair (); hx wisdom teeth extraction (); and TEETH EXTRACTION MULTIPLE (N/A, 2013). .  . She has not been evaluated for her current complaints. Last urinalysis results are:    Urine dipstick shows:    Results for orders placed or performed in visit on 18   AMB POC URINALYSIS DIP STICK MANUAL W/O MICRO     Status: None   Result Value Ref Range Status    Color (UA POC) Yellow  Final    Clarity (UA POC) Clear  Final    Glucose (UA POC) Negative Negative Final    Bilirubin (UA POC) Negative Negative Final    Ketones (UA POC) Negative Negative Final    Specific gravity (UA POC) 1.025 1.001 - 1.035 Final    Blood (UA POC) Trace Negative Final    pH (UA POC) 6.0 4.6 - 8.0 Final    Protein (UA POC) Negative Negative Final    Urobilinogen (UA POC) normal 0.2 - 1 Final    Nitrites (UA POC) Negative Negative Final    Leukocyte esterase (UA POC) 1+ Negative Final       No past medical history on file.   Past Surgical History:   Procedure Laterality Date    HX HERNIA REPAIR      HX WISDOM TEETH EXTRACTION       Social History     Occupational History    Not on file   Tobacco Use    Smoking status: Former Smoker    Smokeless tobacco: Never Used   Substance and Sexual Activity    Alcohol use: Yes     Comment: a few weekends per month    Drug use: No    Sexual activity: Yes     Partners: Male     Birth control/protection: Pill, Condom     Family History   Problem Relation Age of Onset    Prostate Cancer Father        No Known Allergies  Prior to Admission medications    Medication Sig Start Date End Date Taking? Authorizing Provider   norethindrone-e estradiol-iron (LOMEDIA 24 FE) 1 mg-20 mcg (24)/75 mg (4) tab Take 1 Tab by mouth daily. Indications: MENORRHAGIA 5/29/18   Melina Odom MD   HYDROcodone-acetaminophen (LORTAB ELIXIR) 7.5-500 mg/15 mL oral solution Take 10 mL by mouth four (4) times daily as needed for Pain. 6/19/13   Fabiola Steward DDS        Review of Systems: History obtained from the patient  Constitutional: negative for weight loss, fever, night sweats  Breast: negative for breast lumps, nipple discharge, galactorrhea  GI: negative for change in bowel habits, abdominal pain, black or bloody stools  : see HPI, negative for vaginal discharge  MSK: negative for back pain, joint pain, muscle pain  Skin: negative for itching, rash, hives  Psych: negative for anxiety, depression, change in mood      Objective: There were no vitals taken for this visit.     Physical Exam:   PHYSICAL EXAMINATION    Constitutional  · Appearance: well-nourished, well developed, alert, in no acute distress    Gastrointestinal  · Abdominal Examination: abdomen non-tender to palpation, normal bowel sounds, no masses present  · Liver and spleen: no hepatomegaly present, spleen not palpable  · Hernias: no hernias identified    Genitourinary  · External Genitalia: normal appearance for age, no discharge present, no tenderness present, no inflammatory lesions present, no masses present, no atrophy present  · Vagina: normal vaginal vault without central or paravaginal defects, no discharge present, no inflammatory lesions present, no masses present  · Bladder: tender to palpation  · Urethra: appears normal  · Cervix: normal   · Uterus: normal size, shape and consistency  · Adnexa: no adnexal tenderness present, no adnexal masses present  · Perineum: perineum within normal limits, no evidence of trauma, no rashes or skin lesions present  · Anus: anus within normal limits, no hemorrhoids present  · Inguinal Lymph Nodes: no lymphadenopathy present    Skin  · General Inspection: no rash, no lesions identified    Neurologic/Psychiatric  · Mental Status:  · Orientation: grossly oriented to person, place and time  · Mood and Affect: mood normal, affect appropriate    Assessment:   UTI    Plan:   Rx: Macrobid and OTC bladder analgesic

## 2019-01-03 NOTE — PROGRESS NOTES
UTI note Aminah Wallace is a ,  19 y.o. female [address] who presents today with had concerns including UTI. Rowena Tavares Her symptoms started 5 days ago, unchanged since that time. Discomfort is in the suprapubic area and does not radiate. Symptoms are not alleviated by hydration. Symptoms are exacerbated with activity. Patient's other complaints: frequency and burning with urination. .  Her symptoms are moderate. Patient denies back pain, fever and vaginal discharge. There is not any concern of sexual abuse. There is not a history of trauma to the genital area. Patient does not have a history of recurrent UTI. She does not have a history of pyelonephritis. She has a history of  has no past medical history on file. with the following surgical history  has a past surgical history that includes hx hernia repair (); hx wisdom teeth extraction (); and TEETH EXTRACTION MULTIPLE (N/A, 2013). . 
. She has not been evaluated for her current complaints. Urine dipstick shows negative for all components, positive for protein and pH of 7. History reviewed. No pertinent past medical history. Past Surgical History:  
Procedure Laterality Date  HX HERNIA REPAIR   [facility] EXTRACTION   Social History Occupational History  Not on file Tobacco Use  Smoking status: Former Smoker  Smokeless tobacco: Never Used Substance and Sexual Activity  Alcohol use: Yes Comment: a few weekends per month  Drug use: No  
 Sexual activity: Yes  
  Partners: Male Birth control/protection: Pill Family History Problem Relation Age of Onset  Prostate Cancer Father No Known Allergies Prior to Admission medications Medication Sig Start Date End Date Taking? Authorizing Provider  
norethindrone-e estradiol-iron (LOMEDIA 24 FE) 1 mg-20 mcg (24)/75 mg (4) tab Take 1 Tab by mouth daily.  Indications: MENORRHAGIA 18   Lisa Sherita Anne MD  
HYDROcodone-acetaminophen (LORTAB ELIXIR) 7.5-500 mg/15 mL oral solution Take 10 mL by mouth four (4) times daily as needed for Pain. 6/19/13   Derrell Hunter DDS Review of Systems: History obtained from the patient Constitutional: negative for weight loss, fever, night sweats Breast: negative for breast lumps, nipple discharge, galactorrhea GI: negative for change in bowel habits, abdominal pain, black or bloody stools : see HPI, negative for vaginal discharge MSK: negative for back pain, joint pain, muscle pain Skin: negative for itching, rash, hives Psych: negative for anxiety, depression, change in mood Objective: 
Visit Vitals /60 Ht 5' 2\" (1.575 m) Wt 121 lb (54.9 kg) BMI 22.13 kg/m² Physical Exam: PHYSICAL EXAMINATION Constitutional 
· Appearance: well-nourished, well developed, alert, in no acute distress Gastrointestinal 
· Abdominal Examination: abdomen non-tender to palpation, normal bowel sounds, no masses present · Liver and spleen: no hepatomegaly present, spleen not palpable · Hernias: no hernias identified Genitourinary · External Genitalia: normal appearance for age, no discharge present, no tenderness present, no inflammatory lesions present, no masses present, no atrophy present · Vagina: normal vaginal vault without central or paravaginal defects, no discharge present, no inflammatory lesions present, no masses present · Bladder: tender to palpation · Urethra: appears normal 
· Cervix: normal  
· Uterus: normal size, shape and consistency · Adnexa: no adnexal tenderness present, no adnexal masses present · Perineum: perineum within normal limits, no evidence of trauma, no rashes or skin lesions present · Anus: anus within normal limits, no hemorrhoids present · Inguinal Lymph Nodes: no lymphadenopathy present Skin · General Inspection: no rash, no lesions identified Neurologic/Psychiatric · Mental Status: · Orientation: grossly oriented to person, place and time · Mood and Affect: mood normal, affect appropriate Assessment: UTI Plan: Rx:

## 2019-01-03 NOTE — PATIENT INSTRUCTIONS
Urinary Tract Infection in Women: Care Instructions  Your Care Instructions    A urinary tract infection, or UTI, is a general term for an infection anywhere between the kidneys and the urethra (where urine comes out). Most UTIs are bladder infections. They often cause pain or burning when you urinate. UTIs are caused by bacteria and can be cured with antibiotics. Be sure to complete your treatment so that the infection goes away. Follow-up care is a key part of your treatment and safety. Be sure to make and go to all appointments, and call your doctor if you are having problems. It's also a good idea to know your test results and keep a list of the medicines you take. How can you care for yourself at home? · Take your antibiotics as directed. Do not stop taking them just because you feel better. You need to take the full course of antibiotics. · Drink extra water and other fluids for the next day or two. This may help wash out the bacteria that are causing the infection. (If you have kidney, heart, or liver disease and have to limit fluids, talk with your doctor before you increase your fluid intake.)  · Avoid drinks that are carbonated or have caffeine. They can irritate the bladder. · Urinate often. Try to empty your bladder each time. · To relieve pain, take a hot bath or lay a heating pad set on low over your lower belly or genital area. Never go to sleep with a heating pad in place. To prevent UTIs  · Drink plenty of water each day. This helps you urinate often, which clears bacteria from your system. (If you have kidney, heart, or liver disease and have to limit fluids, talk with your doctor before you increase your fluid intake.)  · Urinate when you need to. · Urinate right after you have sex. · Change sanitary pads often. · Avoid douches, bubble baths, feminine hygiene sprays, and other feminine hygiene products that have deodorants.   · After going to the bathroom, wipe from front to back.  When should you call for help? Call your doctor now or seek immediate medical care if:    · Symptoms such as fever, chills, nausea, or vomiting get worse or appear for the first time.     · You have new pain in your back just below your rib cage. This is called flank pain.     · There is new blood or pus in your urine.     · You have any problems with your antibiotic medicine.    Watch closely for changes in your health, and be sure to contact your doctor if:    · You are not getting better after taking an antibiotic for 2 days.     · Your symptoms go away but then come back. Where can you learn more? Go to http://clive-bibi.info/. Enter Y294 in the search box to learn more about \"Urinary Tract Infection in Women: Care Instructions. \"  Current as of: March 21, 2018  Content Version: 11.8  © 0716-1424 [employer]. Care instructions adapted under license by [employer] (which disclaims liability or warranty for this information). If you have questions about a medical condition or this instruction, always ask your healthcare professional. [address] any warranty or liability for your use of this information.

## 2019-01-05 LAB — BACTERIA UR CULT: NO GROWTH

## 2019-01-07 ENCOUNTER — TELEPHONE (OUTPATIENT)
Dept: OBGYN CLINIC | Age: 19
End: 2019-01-07

## 2019-03-15 ENCOUNTER — OFFICE VISIT (OUTPATIENT)
Dept: OBGYN CLINIC | Age: 19
End: 2019-03-15

## 2019-03-15 VITALS
WEIGHT: 119 LBS | HEIGHT: 62 IN | DIASTOLIC BLOOD PRESSURE: 62 MMHG | SYSTOLIC BLOOD PRESSURE: 108 MMHG | BODY MASS INDEX: 21.9 KG/M2

## 2019-03-15 DIAGNOSIS — Z01.419 ENCOUNTER FOR ANNUAL ROUTINE GYNECOLOGICAL EXAMINATION: Primary | ICD-10-CM

## 2019-03-15 NOTE — PATIENT INSTRUCTIONS
Safer Sex: Care Instructions  Your Care Instructions  Safer sex is a way to reduce your risk of getting an infection spread through sex. It can also help prevent pregnancy. Most infections that are spread through sex, also called sexually transmitted infections or STIs, can be cured. But some can decrease your chances of getting pregnant if they are not treated early. Others, such as herpes, have no cure. And some, such as HIV, can be deadly. Several products can help you practice safer sex and reduce your chance of STIs. One of the best is a condom. There are condoms for men and for women. The female condom is a tube of soft plastic with a closed end that is placed deep into the vagina. You can use a special rubber sheet (dental dam) for protection during oral sex. Latex gloves can keep your hands from touching blood, semen, or other body fluids that can carry infections. Remember that birth control methods such as diaphragms, IUDs, foams, and birth control pills do not stop you from getting STIs. Follow-up care is a key part of your treatment and safety. Be sure to make and go to all appointments, and call your doctor if you are having problems. It's also a good idea to know your test results and keep a list of the medicines you take. How can you care for yourself at home? · Think about getting shots to prevent hepatitis A and hepatitis B. These two diseases can be spread through sex. You also can get hepatitis A if you eat infected food. · Use condoms or female condoms each time and every time you have sex. · Learn the right way to use a male condom:  ? Condoms come in several sizes. Make sure you use the right size. A condom that is too small can break easily. A condom that is too big can slip off during sex. Use a new condom each time you have sex. ? Be careful not to poke a hole in the condom when you open the wrapper. ? Squeeze the tip of the condom to keep out air.   ? Pull down the loose skin (foreskin) from the head of an uncircumcised penis. ? While squeezing the tip of the condom, unroll it all the way down to the base of the firm penis. ? Never use petroleum jelly (such as Vaseline), grease, hand lotion, baby oil, or anything with oil in it. These products can make holes in the condom. ? After sex, hold the condom on your penis as you remove your penis from your partner. This will keep semen from spilling out of the condom. · Learn to use a female condom:  ? You can put in a female condom up to 8 hours before sex. ? Squeeze the smaller ring at the closed end and insert it deep into the vagina. The larger ring at the open end should stay outside the vagina. ? During sex, make sure the penis goes into the condom. ? After the penis is removed, close the open end of the condom by twisting it. Remove the condom. · Do not use a female condom and male condom at the same time. · Do not have sex with anyone who has symptoms of an STI, such as sores on the genitals or mouth. The herpes virus that causes cold sores can spread to and from the penis and vagina. · Do not drink a lot of alcohol or use drugs before sex. This can cause you to let down your guard and not practice safer sex. · Having one sex partner (who does not have STIs and does not have sex with anyone else) is a sure way to avoid STIs. · Talk to your partner before you have sex. Find out if he or she has or is at risk for any STI. Keep in mind that a person may be able to spread an STI even if he or she does not have symptoms. You and your partner may want to get an HIV test. You should get tested again 6 months later. Where can you learn more? Go to http://clive-bibi.info/. Enter D567 in the search box to learn more about \"Safer Sex: Care Instructions. \"  Current as of: September 11, 2018  Content Version: 11.9  © 5320-9649 [employer]  Care instructions adapted under license by [employer] (which disclaims liability or warranty for this information). If you have questions about a medical condition or this instruction, always ask your healthcare professional. [address] any warranty or liability for your use of this information.

## 2019-03-19 ENCOUNTER — TELEPHONE (OUTPATIENT)
Dept: OBGYN CLINIC | Age: 19
End: 2019-03-19

## 2019-03-19 DIAGNOSIS — Z01.419 ENCOUNTER FOR ANNUAL ROUTINE GYNECOLOGICAL EXAMINATION: ICD-10-CM

## 2019-03-19 NOTE — TELEPHONE ENCOUNTER
Call received at 335PM    22year old patient last seen in the office on 3/15/19. Patient calling to ask that her ocp prescription be resent to her pharmacy in [address] [facility] as per MD order to patient requested pharmacy. Patient verbalized understanding.

## 2019-03-20 LAB
C TRACH RRNA CVX QL NAA+PROBE: POSITIVE
CYTOLOGIST CVX/VAG CYTO: ABNORMAL
CYTOLOGY CVX/VAG DOC THIN PREP: ABNORMAL
DX ICD CODE: ABNORMAL
LABCORP, 190119: ABNORMAL
Lab: ABNORMAL
N GONORRHOEA RRNA CVX QL NAA+PROBE: NEGATIVE
OTHER STN SPEC: ABNORMAL
PATH REPORT.FINAL DX SPEC: ABNORMAL
STAT OF ADQ CVX/VAG CYTO-IMP: ABNORMAL

## 2019-03-21 NOTE — PROGRESS NOTES
Abnormal results. Please notify pt. Update pap per protocol :add no endocx add chl + yeast +  Notify pt yeast on pap smear. Recommend 3d monistat or similar product for yeast infections or diflucan 150mg x1 if having sx. Ok to observe if not having symptoms. Update FS. +CHL  Rx: 1 gram azithromycin po x1  This is a sexually transmitted disease. Intimate/sexual partner(s) need to be treated by their MD/PCP/clinic. The patient should notify them, or she can contact the health department for assistance. Rec abstinence until patient and partner(s) are treated +1 week. I recommend consistent condom use to decrease STD in the future. I recommend returning to see me to reevaluate infection in about three months. Please schedule appointment. I also recommend testing for other STDs if it hasn't been done recently: such as HIV/hepatitis and syphilis. She can RTC for lab work, or we can test at follow up visit.

## 2019-03-27 ENCOUNTER — TELEPHONE (OUTPATIENT)
Dept: OBGYN CLINIC | Age: 19
End: 2019-03-27

## 2019-03-27 RX ORDER — FLUCONAZOLE 150 MG/1
150 TABLET ORAL DAILY
Qty: 1 TAB | Refills: 0 | Status: SHIPPED | OUTPATIENT
Start: 2019-03-27 | End: 2019-03-28

## 2019-03-27 RX ORDER — AZITHROMYCIN 500 MG/1
1000 TABLET, FILM COATED ORAL DAILY
Qty: 2 TAB | Refills: 0 | Status: SHIPPED | OUTPATIENT
Start: 2019-03-27 | End: 2019-03-28

## 2019-03-27 NOTE — TELEPHONE ENCOUNTER
Patient prefers to use Diflucan tablet and Zithromax to be sent to the [address] [place] [address] (in computer). We discussed prevention and tx. She is scheduled for [facility] appt on 6/21/19. We discussed protection and not to have intercourse with that person(s) until treated x 1 week. Rx's sent and confirmed receipt.

## 2019-03-27 NOTE — TELEPHONE ENCOUNTER
Patient of TP. Calling to get her lab results:    Notes recorded by Robbi Candelario MD on 3/20/2019 at 9:44 PM EDT  Abnormal results. Please notify pt. Update pap per protocol :add no endocx add chl + yeast +  Notify pt yeast on pap smear. Recommend 3d monistat or similar product for yeast infections or diflucan 150mg x1 if having sx. Ok to observe if not having symptoms. Update FS. +CHL  Rx: 1 gram azithromycin po x1  This is a sexually transmitted disease. Intimate/sexual partner(s) need to be treated by their MD/PCP/clinic.    The patient should notify them, or she can contact the health department for assistance. Rec abstinence until patient and partner(s) are treated +1 week. I recommend consistent condom use to decrease STD in the future. I recommend returning to see me to reevaluate infection in about three months. Please schedule appointment. I also recommend testing for other STDs if it hasn't been done recently: such as HIV/hepatitis and syphilis. She can RTC for lab work, or we can test at follow up visit.

## 2019-06-20 ENCOUNTER — OFFICE VISIT (OUTPATIENT)
Dept: OBGYN CLINIC | Age: 19
End: 2019-06-20

## 2019-06-20 VITALS
SYSTOLIC BLOOD PRESSURE: 112 MMHG | WEIGHT: 116.6 LBS | BODY MASS INDEX: 21.46 KG/M2 | HEIGHT: 62 IN | DIASTOLIC BLOOD PRESSURE: 50 MMHG

## 2019-06-20 DIAGNOSIS — Z20.2 EXPOSURE TO SEXUALLY TRANSMITTED DISEASE (STD): Primary | ICD-10-CM

## 2019-06-20 NOTE — PROGRESS NOTES
[facility]  [email or web address]    Fito Terrazas MD, [address] [facility]       OB/GYN Follow-up visit    Chief Complaint: Follow up visit  Chief Complaint   Patient presents with    Exposure to STD     follow-up MCKENZIE          History of Present Illness: This is a follow up visit from 3-27-19. She is having a follow up for Chlamydia positive . The patient reports having no symptoms or concerns. She does not have other concerns. LMP: No LMP recorded. PFSH:  History reviewed. No pertinent past medical history. Past Surgical History:   Procedure Laterality Date    HX HERNIA REPAIR  2005    HX WISDOM TEETH EXTRACTION  2014     Family History   Problem Relation Age of Onset    Prostate Cancer Father      Social History     Tobacco Use    Smoking status: Former Smoker    Smokeless tobacco: Never Used   Substance Use Topics    Alcohol use: Yes     Comment: a few weekends per month    Drug use: No     No Known Allergies  Current Outpatient Medications   Medication Sig    norethindrone-e estradiol-iron (LOMEDIA 24 FE) 1 mg-20 mcg (24)/75 mg (4) tab Take 1 Tab by mouth daily.  nitrofurantoin, macrocrystal-monohydrate, (MACROBID) 100 mg capsule Take 1 Cap by mouth two (2) times a day.  HYDROcodone-acetaminophen (LORTAB ELIXIR) 7.5-500 mg/15 mL oral solution Take 10 mL by mouth four (4) times daily as needed for Pain. No current facility-administered medications for this visit.         Review of Systems:  History obtained from the patient  Constitutional: negative for fevers, chills and weight loss  ENT ROS: negative for - hearing change, oral lesions or visual changes  Respiratory: negative for cough, wheezing or dyspnea on exertion  Cardiovascular: negative for chest pain, irregular heart beats, exertional chest pressure/discomfort  Gastrointestinal: negative for dysphagia, nausea and vomiting  Genito-Urinary ROS: no dysuria, trouble voiding, or hematuria  Inteument/breast: negative for rash, breast lump and nipple discharge  Musculoskeletal:negative for stiff joints, neck pain and muscle weakness  Endocrine ROS: negative for - breast changes, galactorrhea or temperature intolerance  Hematological and Lymphatic ROS: negative for - blood clots, bruising or swollen lymph nodes      Physical Exam:  Visit Vitals  /50 (BP 1 Location: Right arm, BP Patient Position: Sitting)   Ht 5' 2\" (1.575 m)   Wt 116 lb 9.6 oz (52.9 kg)   BMI 21.33 kg/m²       GENERAL: alert, well appearing, and in no distress    ABDOMEN: soft, nontender, nondistended, no masses or organomegaly   EGBUS: no lesions, no inflammation, no masses  VULVA: normal appearing vulva with no masses, tenderness or lesions  VAGINA: normal appearing vagina with normal color, no lesions, no discharge  CERVIX: normal appearing cervix without discharge or lesions, non tender  UTERUS: uterus is normal size, shape, consistency and nontender   ADNEXA: normal adnexa in size, nontender and no masses  NEURO: alert, oriented, normal speech    Assessment:  Encounter Diagnosis   Name Primary?  Exposure to sexually transmitted disease (STD) Yes       Plan:  The patient is advised that she should contact the office with any questions or concerns. She should make her routine annual gynecologic appointment if needed. Discussed safer sex practices, condom use and risk factors for sexually transmitted diseases. Orders Placed This Encounter    NUSWAB VAGINITIS PLUS    HEP B SURFACE AG    HIV SCREEN, 4TH GEN. W/REFLEX CONFIRM    T PALLIDUM SCREEN W/REFLEX       No results found for this visit on 06/20/19.     Racheal Jacobson MD

## 2019-06-20 NOTE — PATIENT INSTRUCTIONS
Exposure to Sexually Transmitted Infections: Care Instructions  Your Care Instructions  Sexually transmitted infections (STIs) are those diseases spread by sexual contact. There are at least 20 different STIs, including chlamydia, gonorrhea, syphilis, and human immunodeficiency virus (HIV), which causes AIDS. Bacteria-caused STIs can be treated and cured. STIs caused by viruses, such as HIV, can be treated but not cured. Some STIs can reduce a woman's chances of getting pregnant in the future. STIs are spread during sexual contact, such as vaginal intercourse and oral or anal sex. Follow-up care is a key part of your treatment and safety. Be sure to make and go to all appointments, and call your doctor if you are having problems. It's also a good idea to know your test results and keep a list of the medicines you take. How can you care for yourself at home? · Your doctor may have given you a shot of antibiotics. If your doctor prescribed antibiotic pills, take them as directed. Do not stop taking them just because you feel better. You need to take the full course of antibiotics. · Do not have sexual contact while you have symptoms of an STI or are being treated for an STI. · Tell your sex partner (or partners) that he or she will need treatment. · If you are a woman, do not douche. Douching changes the normal balance of bacteria in the vagina and may spread an infection up into your reproductive organs. To prevent exposure to STIs in the future  · Use latex condoms every time you have sex. Use them from the beginning to the end of sexual contact. · Talk to your partner before you have sex. Find out if he or she has or is at risk for any STI. Keep in mind that a person may be able to spread an STI even if he or she does not have symptoms. · Do not have sex if you are being treated for an STI. · Do not have sex with anyone who has symptoms of an STI, such as sores on the genitals or mouth.   · Having one sex partner (who does not have STIs and does not have sex with anyone else) is a good way to avoid STIs. When should you call for help? Call your doctor now or seek immediate medical care if:    · You have new pain in your belly or pelvis.     · You have symptoms of a urinary tract infection. These may include:  ? Pain or burning when you urinate. ? A frequent need to urinate without being able to pass much urine. ? Pain in the flank, which is just below the rib cage and above the waist on either side of the back. ? Blood in your urine. ? A fever.     · You have new or worsening pain or swelling in the scrotum.    Watch closely for changes in your health, and be sure to contact your doctor if:    · You have unusual vaginal bleeding.     · You have a discharge from the vagina or penis.     · You have any new symptoms, such as sores, bumps, rashes, blisters, or warts.     · You have itching, tingling, pain, or burning in the genital or anal area.     · You think you may have an STI. Where can you learn more? Go to http://clive-bibi.info/. Enter A655 in the search box to learn more about \"Exposure to Sexually Transmitted Infections: Care Instructions. \"  Current as of: September 11, 2018  Content Version: 11.9  © 8292-1207 [employer]. Care instructions adapted under license by [employer] (which disclaims liability or warranty for this information). If you have questions about a medical condition or this instruction, always ask your healthcare professional. [place] [address] any warranty or liability for your use of this information.

## 2019-06-22 LAB
HBV SURFACE AG SERPL QL IA: NEGATIVE
HIV 1+2 AB+HIV1 P24 AG SERPL QL IA: NON REACTIVE
T PALLIDUM AB SER QL IA: NEGATIVE

## 2019-06-24 LAB
A VAGINAE DNA VAG QL NAA+PROBE: ABNORMAL SCORE
BVAB2 DNA VAG QL NAA+PROBE: ABNORMAL SCORE
C ALBICANS DNA VAG QL NAA+PROBE: POSITIVE
C GLABRATA DNA VAG QL NAA+PROBE: NEGATIVE
C TRACH RRNA SPEC QL NAA+PROBE: NEGATIVE
MEGA1 DNA VAG QL NAA+PROBE: ABNORMAL SCORE
N GONORRHOEA RRNA SPEC QL NAA+PROBE: NEGATIVE
T VAGINALIS RRNA SPEC QL NAA+PROBE: NEGATIVE

## 2019-06-24 RX ORDER — FLUCONAZOLE 150 MG/1
150 TABLET ORAL DAILY
Qty: 1 TAB | Refills: 0 | Status: SHIPPED | OUTPATIENT
Start: 2019-06-24 | End: 2020-03-16

## 2020-03-10 ENCOUNTER — TELEPHONE (OUTPATIENT)
Dept: OBGYN CLINIC | Age: 20
End: 2020-03-10

## 2020-03-10 DIAGNOSIS — Z01.419 ENCOUNTER FOR ANNUAL ROUTINE GYNECOLOGICAL EXAMINATION: ICD-10-CM

## 2020-03-16 ENCOUNTER — HOSPITAL ENCOUNTER (OUTPATIENT)
Dept: LAB | Age: 20
Discharge: HOME | End: 2020-03-16

## 2020-03-16 ENCOUNTER — OFFICE VISIT (OUTPATIENT)
Dept: OBGYN CLINIC | Age: 20
End: 2020-03-16

## 2020-03-16 ENCOUNTER — OFFICE VISIT (OUTPATIENT)
Dept: INTERNAL MEDICINE CLINIC | Age: 20
End: 2020-03-16

## 2020-03-16 VITALS
HEART RATE: 69 BPM | BODY MASS INDEX: 21.71 KG/M2 | RESPIRATION RATE: 20 BRPM | HEIGHT: 62 IN | WEIGHT: 118 LBS | DIASTOLIC BLOOD PRESSURE: 71 MMHG | SYSTOLIC BLOOD PRESSURE: 114 MMHG | TEMPERATURE: 98.1 F

## 2020-03-16 VITALS
HEIGHT: 62 IN | SYSTOLIC BLOOD PRESSURE: 110 MMHG | DIASTOLIC BLOOD PRESSURE: 62 MMHG | WEIGHT: 117.6 LBS | BODY MASS INDEX: 21.64 KG/M2

## 2020-03-16 DIAGNOSIS — M62.9 MUSCULOSKELETAL DISORDER INVOLVING UPPER TRAPEZIUS MUSCLE: ICD-10-CM

## 2020-03-16 DIAGNOSIS — S40.021D CONTUSION OF RIGHT UPPER ARM, SUBSEQUENT ENCOUNTER: Primary | ICD-10-CM

## 2020-03-16 DIAGNOSIS — Z20.2 STD EXPOSURE: ICD-10-CM

## 2020-03-16 DIAGNOSIS — Z01.419 WELL WOMAN EXAM WITH ROUTINE GYNECOLOGICAL EXAM: Primary | ICD-10-CM

## 2020-03-16 DIAGNOSIS — R51.9 FACIAL PAIN: ICD-10-CM

## 2020-03-16 DIAGNOSIS — Z01.419 WELL WOMAN EXAM WITH ROUTINE GYNECOLOGICAL EXAM: ICD-10-CM

## 2020-03-16 RX ORDER — FLUTICASONE PROPIONATE 50 MCG
SPRAY, SUSPENSION (ML) NASAL AS NEEDED
COMMUNITY
Start: 2020-01-06

## 2020-03-16 RX ORDER — NORETHINDRONE ACETATE AND ETHINYL ESTRADIOL 1MG-20(24)
KIT ORAL DAILY
COMMUNITY
Start: 2019-12-11 | End: 2020-03-16 | Stop reason: SDUPTHER

## 2020-03-16 RX ORDER — CETIRIZINE HCL 10 MG
TABLET ORAL AS NEEDED
COMMUNITY
Start: 2020-01-06

## 2020-03-16 RX ORDER — NORETHINDRONE ACETATE AND ETHINYL ESTRADIOL 1MG-20(24)
1 KIT ORAL DAILY
Qty: 3 PACKAGE | Refills: 4 | Status: SHIPPED | OUTPATIENT
Start: 2020-03-16
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
## 2020-03-16 NOTE — PATIENT INSTRUCTIONS

## 2020-03-16 NOTE — PROGRESS NOTES
[facility]  [email or web address]  [phone]    Dakota Root MD, FACOG       Annual Gynecologic Exam:  WWE <40  Chief Complaint   Patient presents with    Well Woman         Monik Boateng is a 23 y.o.  [address] female who presents for an annual well woman exam.  Patient's last menstrual period was 2020 (exact date). .    With regard to the Gardisil vaccine, she received 2 of the 3 injections. She does not report additional concerns today. Menstrual status:  Her periods are regular cycles. She does not report dysmenorrhea/painful menses. She does not report irregular bleeding. Sexual history and Contraception:  Social History     Substance and Sexual Activity   Sexual Activity Yes    Partners: Male    Birth control/protection: Pill     She sometimes use condoms with sexual activity  She does not reports new sexual partner(s) in the last year. The patient does not request STD testing. We recommended testing per CDC guidelines and at patient request.     Preventive Medicine History:  Her most recent Pap smear result: normal was obtained in 2019. Her most recent HR HPV screen was not obtained due to age. She does not have a history of MANJINDER 2, 3 or cervical cancer. History reviewed. No pertinent past medical history.   OB History    Para Term  AB Living   1 0 0 0 1     SAB TAB Ectopic Molar Multiple Live Births   0 1 0 0          # Outcome Date GA Lbr Rickie/2nd Weight Sex Delivery Anes PTL Lv   1 TAB 2016             Past Surgical History:   Procedure Laterality Date    HX HERNIA REPAIR  2005    HX WISDOM TEETH EXTRACTION       Family History   Problem Relation Age of Onset    Prostate Cancer Father      Social History     Socioeconomic History    Marital status: SINGLE     Spouse name: Not on file    Number of children: Not on file    Years of education: Not on file    Highest education level: Not on file Occupational History    Not on file   Social Needs    Financial resource strain: Not on file    Food insecurity     Worry: Not on file     Inability: Not on file    Transportation needs     Medical: Not on file     Non-medical: Not on file   Tobacco Use    Smoking status: Former Smoker    Smokeless tobacco: Never Used   Substance and Sexual Activity    Alcohol use: Yes     Comment: a few weekends per month    Drug use: Never    Sexual activity: Yes     Partners: Male     Birth control/protection: Pill   Lifestyle    Physical activity     Days per week: Not on file     Minutes per session: Not on file    Stress: Not on file   Relationships    Social connections     Talks on phone: Not on file     Gets together: Not on file     Attends Tenriism service: Not on file     Active member of club or organization: Not on file     Attends meetings of clubs or organizations: Not on file     Relationship status: Not on file    Intimate partner violence     Fear of current or ex partner: Not on file     Emotionally abused: Not on file     Physically abused: Not on file     Forced sexual activity: Not on file   Other Topics Concern    Not on file   Social History Narrative    ** Merged History Encounter **            No Known Allergies    Current Outpatient Medications   Medication Sig    cetirizine (ZYRTEC) 10 mg tablet Take  by mouth as needed.  fluticasone propionate (FLONASE) 50 mcg/actuation nasal spray by Both Nostrils route as needed.  norethindrone-e estradiol-iron (Blisovi 24 Fe) 1 mg-20 mcg (24)/75 mg (4) tab Take 1 Tab by mouth daily. No current facility-administered medications for this visit.         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    Dysmenorrhea N94.6    Excessive bleeding in premenopausal period N92.4       Review of Systems - History obtained from the patient  Constitutional: negative for weight loss, fever, night sweats  HEENT: negative for hearing loss, earache, congestion, snoring, sorethroat  CV: negative for chest pain, palpitations, edema  Resp: negative for cough, shortness of breath, wheezing  GI: negative for change in bowel habits, abdominal pain, black or bloody stools  : negative for frequency, dysuria, hematuria  GYN: see HPI  MSK: negative for back pain, joint pain, muscle pain  Breast: negative for breast lumps, nipple discharge, galactorrhea  Skin :negative for itching, rash, hives  Neuro: negative for dizziness, headache, confusion, weakness  Psych: negative for anxiety, depression, change in mood  Heme/lymph: negative for bleeding, bruising, pallor      (WWE continued)     Physical Exam  Visit Vitals  /62 (BP 1 Location: Left arm, BP Patient Position: Sitting)   Ht 5' 2\" (1.575 m)   Wt 117 lb 9.6 oz (53.3 kg)   LMP 03/03/2020 (Exact Date)   BMI 21.51 kg/m²       Constitutional  · Appearance: well-nourished, well developed, alert, in no acute distress    HENT  · Head and Face: appears normal    Neck  · Inspection/Palpation: normal appearance, no masses or tenderness  · Lymph Nodes: no lymphadenopathy present  · Thyroid: gland size normal, nontender, no nodules or masses present on palpation    Chest  · Respiratory Effort: breathing unlabored  · Auscultation: normal breath sounds    Cardiovascular  · Heart:  · Auscultation: regular rate and rhythm without murmur    Breasts  · Inspection of Breasts: breasts symmetrical, no skin changes, no discharge present, nipple appearance normal, no skin retraction present  · Palpation of Breasts and Axillae: no masses present on palpation, no breast tenderness  · Axillary Lymph Nodes: no lymphadenopathy present    Gastrointestinal  · Abdominal Examination: abdomen non-tender to palpation, normal bowel sounds, no masses present  · Liver and spleen: no hepatomegaly present, spleen not palpable  · Hernias: no hernias identified    Genitourinary  · External Genitalia: normal appearance for age, no discharge present, no tenderness present, no inflammatory lesions present, no masses present  · Vagina: normal vaginal vault without central or paravaginal defects, no discharge present, no inflammatory lesions present, no masses present  · Bladder: non-tender to palpation  · Urethra: appears normal  · Cervix: normal   · Uterus: normal size, shape and consistency  · Adnexa: no adnexal tenderness present, no adnexal masses present  · Perineum: perineum within normal limits, no evidence of trauma, no rashes or skin lesions present  · Anus: anus within normal limits, no hemorrhoids present  · Inguinal Lymph Nodes: no lymphadenopathy present    Skin  · General Inspection: no rash, no lesions identified    Neurologic/Psychiatric  · Mental Status:  · Orientation: grossly oriented to person, place and time  · Mood and Affect: mood normal, affect appropriate    Assessment:  23 y.o.  for well woman exam  Encounter Diagnoses   Name Primary?  Well woman exam with routine gynecological exam Yes    STD exposure        Plan:  The patient was counseled about diet, exercise, healthy lifestyle  We discussed self breast exam  We discussed safer sex practices, condom use and risk factors for sexually transmitted diseases. We discussed current pap smear and HR HPV testing guidelines. We recommend follow up one year for routine annual gynecologic exam or sooner prn  We recommend routine follow up with her primary care doctor for management of chronic medical problems and non-gynecologic concerns  Handouts were given to the patient  We discussed calcium/vitamin D/weight bearing exercise and osteoporosis prevention  Discussed risks, benefits and alternatives of OCP/nuvaring/patch: including but not limited to dvt/pe/mi/cva/ca/gi risks and that smoking, increasing age and other health conditions can increase these risks.        Folllow up:  [x] return for annual well woman exam in one year or sooner if she is having problems  [] follow up and ultrasound  [] 6 months  [] 3 months  [] 6 weeks   [] 1 month    Orders Placed This Encounter    CHLAMYDIA/GC PCR    norethindrone-e estradiol-iron (Blisovi 24 Fe) 1 mg-20 mcg (24)/75 mg (4) tab       No results found for any visits on 03/16/20.
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
## 2020-03-19 LAB
C TRACH RRNA SPEC QL NAA+PROBE: NEGATIVE
N GONORRHOEA RRNA SPEC QL NAA+PROBE: NEGATIVE
SPECIMEN SOURCE: NORMAL

## 2022-03-19 PROBLEM — N94.6 DYSMENORRHEA: Status: ACTIVE | Noted: 2018-05-29

## 2022-03-19 PROBLEM — N92.4 EXCESSIVE BLEEDING IN PREMENOPAUSAL PERIOD: Status: ACTIVE | Noted: 2018-05-29

## 2024-06-12 ENCOUNTER — HOSPITAL ENCOUNTER (EMERGENCY)
Facility: HOSPITAL | Age: 24
Discharge: HOME | End: 2024-06-12
Attending: STUDENT IN AN ORGANIZED HEALTH CARE EDUCATION/TRAINING PROGRAM
Payer: MEDICAID

## 2024-06-12 VITALS
HEART RATE: 76 BPM | TEMPERATURE: 99.3 F | BODY MASS INDEX: 21.38 KG/M2 | RESPIRATION RATE: 16 BRPM | SYSTOLIC BLOOD PRESSURE: 110 MMHG | DIASTOLIC BLOOD PRESSURE: 73 MMHG | HEIGHT: 62 IN | WEIGHT: 116.18 LBS | OXYGEN SATURATION: 99 %

## 2024-06-12 DIAGNOSIS — R11.2 NAUSEA AND VOMITING, UNSPECIFIED VOMITING TYPE: Primary | ICD-10-CM

## 2024-06-12 DIAGNOSIS — R19.7 DIARRHEA, UNSPECIFIED TYPE: ICD-10-CM

## 2024-06-12 LAB
ALBUMIN SERPL-MCNC: 4.4 G/DL (ref 3.5–5)
ALBUMIN/GLOB SERPL: 1.2 (ref 1.1–2.2)
ALP SERPL-CCNC: 100 U/L (ref 45–117)
ALT SERPL-CCNC: 16 U/L (ref 12–78)
ANION GAP SERPL CALC-SCNC: 3 MMOL/L (ref 5–15)
AST SERPL-CCNC: 12 U/L (ref 15–37)
BASOPHILS # BLD: 0 K/UL (ref 0–0.1)
BASOPHILS NFR BLD: 0 % (ref 0–1)
BILIRUB SERPL-MCNC: 0.7 MG/DL (ref 0.2–1)
BUN SERPL-MCNC: 13 MG/DL (ref 6–20)
BUN/CREAT SERPL: 15 (ref 12–20)
CALCIUM SERPL-MCNC: 9.3 MG/DL (ref 8.5–10.1)
CHLORIDE SERPL-SCNC: 106 MMOL/L (ref 97–108)
CO2 SERPL-SCNC: 27 MMOL/L (ref 21–32)
COMMENT:: NORMAL
CREAT SERPL-MCNC: 0.86 MG/DL (ref 0.55–1.02)
DIFFERENTIAL METHOD BLD: ABNORMAL
EOSINOPHIL # BLD: 0.1 K/UL (ref 0–0.4)
EOSINOPHIL NFR BLD: 1 % (ref 0–7)
ERYTHROCYTE [DISTWIDTH] IN BLOOD BY AUTOMATED COUNT: 12.9 % (ref 11.5–14.5)
GLOBULIN SER CALC-MCNC: 3.8 G/DL (ref 2–4)
GLUCOSE SERPL-MCNC: 121 MG/DL (ref 65–100)
HCT VFR BLD AUTO: 40.5 % (ref 35–47)
HGB BLD-MCNC: 13.6 G/DL (ref 11.5–16)
IMM GRANULOCYTES # BLD AUTO: 0 K/UL (ref 0–0.04)
IMM GRANULOCYTES NFR BLD AUTO: 0 % (ref 0–0.5)
LIPASE SERPL-CCNC: 26 U/L (ref 13–75)
LYMPHOCYTES # BLD: 0.3 K/UL (ref 0.8–3.5)
LYMPHOCYTES NFR BLD: 3 % (ref 12–49)
MCH RBC QN AUTO: 27.8 PG (ref 26–34)
MCHC RBC AUTO-ENTMCNC: 33.6 G/DL (ref 30–36.5)
MCV RBC AUTO: 82.7 FL (ref 80–99)
MONOCYTES # BLD: 0.5 K/UL (ref 0–1)
MONOCYTES NFR BLD: 5 % (ref 5–13)
NEUTS SEG # BLD: 8.7 K/UL (ref 1.8–8)
NEUTS SEG NFR BLD: 91 % (ref 32–75)
NRBC # BLD: 0 K/UL (ref 0–0.01)
NRBC BLD-RTO: 0 PER 100 WBC
PLATELET # BLD AUTO: 224 K/UL (ref 150–400)
PMV BLD AUTO: 9.8 FL (ref 8.9–12.9)
POTASSIUM SERPL-SCNC: 3.8 MMOL/L (ref 3.5–5.1)
PROT SERPL-MCNC: 8.2 G/DL (ref 6.4–8.2)
RBC # BLD AUTO: 4.9 M/UL (ref 3.8–5.2)
RBC MORPH BLD: ABNORMAL
SODIUM SERPL-SCNC: 136 MMOL/L (ref 136–145)
SPECIMEN HOLD: NORMAL
WBC # BLD AUTO: 9.6 K/UL (ref 3.6–11)

## 2024-06-12 PROCEDURE — 2580000003 HC RX 258: Performed by: STUDENT IN AN ORGANIZED HEALTH CARE EDUCATION/TRAINING PROGRAM

## 2024-06-12 PROCEDURE — 6360000002 HC RX W HCPCS: Performed by: STUDENT IN AN ORGANIZED HEALTH CARE EDUCATION/TRAINING PROGRAM

## 2024-06-12 PROCEDURE — 83690 ASSAY OF LIPASE: CPT

## 2024-06-12 PROCEDURE — 36415 COLL VENOUS BLD VENIPUNCTURE: CPT

## 2024-06-12 PROCEDURE — 96374 THER/PROPH/DIAG INJ IV PUSH: CPT

## 2024-06-12 PROCEDURE — 80053 COMPREHEN METABOLIC PANEL: CPT

## 2024-06-12 PROCEDURE — 99284 EMERGENCY DEPT VISIT MOD MDM: CPT

## 2024-06-12 PROCEDURE — 96361 HYDRATE IV INFUSION ADD-ON: CPT

## 2024-06-12 PROCEDURE — 85025 COMPLETE CBC W/AUTO DIFF WBC: CPT

## 2024-06-12 RX ORDER — ONDANSETRON 4 MG/1
4 TABLET, ORALLY DISINTEGRATING ORAL 3 TIMES DAILY PRN
Qty: 21 TABLET | Refills: 0 | Status: SHIPPED | OUTPATIENT
Start: 2024-06-12

## 2024-06-12 RX ORDER — DICYCLOMINE HCL 20 MG
20 TABLET ORAL 4 TIMES DAILY
Qty: 20 TABLET | Refills: 0 | Status: SHIPPED | OUTPATIENT
Start: 2024-06-12

## 2024-06-12 RX ORDER — ONDANSETRON 2 MG/ML
4 INJECTION INTRAMUSCULAR; INTRAVENOUS ONCE
Status: COMPLETED | OUTPATIENT
Start: 2024-06-12 | End: 2024-06-12

## 2024-06-12 RX ORDER — 0.9 % SODIUM CHLORIDE 0.9 %
1000 INTRAVENOUS SOLUTION INTRAVENOUS ONCE
Status: COMPLETED | OUTPATIENT
Start: 2024-06-12 | End: 2024-06-12

## 2024-06-12 RX ADMIN — ONDANSETRON 4 MG: 2 INJECTION INTRAMUSCULAR; INTRAVENOUS at 21:44

## 2024-06-12 RX ADMIN — SODIUM CHLORIDE 1000 ML: 9 INJECTION, SOLUTION INTRAVENOUS at 21:44

## 2024-06-12 ASSESSMENT — ENCOUNTER SYMPTOMS
COUGH: 0
VOMITING: 1
NAUSEA: 1
DIARRHEA: 1
SINUS PAIN: 0
BACK PAIN: 0
COLOR CHANGE: 0
RECTAL PAIN: 0
RHINORRHEA: 0
WHEEZING: 0
SORE THROAT: 0
EYE DISCHARGE: 0
SHORTNESS OF BREATH: 0
ABDOMINAL PAIN: 0

## 2024-06-12 ASSESSMENT — PAIN - FUNCTIONAL ASSESSMENT: PAIN_FUNCTIONAL_ASSESSMENT: 0-10

## 2024-06-12 ASSESSMENT — PAIN DESCRIPTION - LOCATION: LOCATION: ABDOMEN

## 2024-06-12 ASSESSMENT — PAIN SCALES - GENERAL: PAINLEVEL_OUTOF10: 3

## 2024-06-13 NOTE — ED TRIAGE NOTES
Pt amb to ED for c/o vomiting x6 episodes, abd pain, and diarrhea starting @ 1900 today.   Denies urinary symptoms, fever, constipation.   Reports hernia repair at 5yrs old.

## 2024-06-13 NOTE — ED PROVIDER NOTES
[facility] EMERGENCY DEPT  EMERGENCY DEPARTMENT ENCOUNTER      Pt Name: Kim Lee  MRN: [id number]  Birthdate 2000  Date of evaluation: 6/12/2024  Provider: CLEMENITNA Ardon    CHIEF COMPLAINT       Chief Complaint   Patient presents with    Abdominal Pain         HISTORY OF PRESENT ILLNESS    Patient is a 24-year-old female who presents to ED complaining of nausea, vomiting, diarrhea that started 2 hours ago.  Reports she developed sudden onset, nausea, vomiting, diarrhea and abdominal discomfort.  Reports nauseous feeling.  She is unsure if it is related to something she ate.  She denies any fever, chills, decreased urination.  No meds prior to arrival            Review of External Medical Records:     Nursing Notes were reviewed.    REVIEW OF SYSTEMS       Review of Systems   Constitutional:  Negative for chills and fever.   HENT:  Negative for congestion, ear discharge, ear pain, rhinorrhea, sinus pain, sore throat and tinnitus.    Eyes:  Negative for discharge.   Respiratory:  Negative for cough, shortness of breath and wheezing.    Cardiovascular:  Negative for chest pain and palpitations.   Gastrointestinal:  Positive for diarrhea, nausea and vomiting. Negative for abdominal pain and rectal pain.   Genitourinary:  Negative for dysuria and hematuria.   Musculoskeletal:  Negative for arthralgias, back pain, neck pain and neck stiffness.   Skin:  Negative for color change and wound.   Neurological:  Negative for syncope, weakness, numbness and headaches.   All other systems reviewed and are negative.      Except as noted above the remainder of the review of systems was reviewed and negative.       PAST MEDICAL HISTORY   No past medical history on file.      SURGICAL HISTORY       Past Surgical History:   Procedure Laterality Date    HERNIA REPAIR  2005    WISDOM TOOTH EXTRACTION  2014         CURRENT MEDICATIONS       Discharge Medication List as of 6/12/2024 10:50 PM        CONTINUE these medications

## 2024-06-13 NOTE — ED NOTES
Pt discharged to home in [place], states understanding of dc instructions and follow up, rx x 2 given;

## 2024-06-13 NOTE — DISCHARGE INSTRUCTIONS
Take zofran as prescribed for nausea. Take bentyl to help with abdominal cramping.  You can also alternate Tylenol 1000 mg and ibuprofen 800 mg every 4 hours as needed for pain.  Please follow-up with your primary care physician.